# Patient Record
Sex: FEMALE | Race: WHITE | NOT HISPANIC OR LATINO | Employment: FULL TIME | URBAN - METROPOLITAN AREA
[De-identification: names, ages, dates, MRNs, and addresses within clinical notes are randomized per-mention and may not be internally consistent; named-entity substitution may affect disease eponyms.]

---

## 2017-12-15 PROBLEM — M54.50 ACUTE BILATERAL LOW BACK PAIN WITHOUT SCIATICA: Status: ACTIVE | Noted: 2017-12-15

## 2018-05-03 PROBLEM — M79.2 PERIPHERAL NEUROPATHIC PAIN: Status: ACTIVE | Noted: 2018-05-03

## 2018-06-12 PROBLEM — G89.29 CHRONIC HEEL PAIN, LEFT: Status: ACTIVE | Noted: 2018-06-12

## 2018-06-12 PROBLEM — M79.672 CHRONIC HEEL PAIN, LEFT: Status: ACTIVE | Noted: 2018-06-12

## 2018-07-18 PROBLEM — M72.2 PLANTAR FASCIITIS, LEFT: Status: ACTIVE | Noted: 2018-07-18

## 2018-07-18 PROBLEM — M77.32 CALCANEAL SPUR OF LEFT FOOT: Status: ACTIVE | Noted: 2018-07-18

## 2018-09-25 PROBLEM — F41.9 ANXIETY: Status: ACTIVE | Noted: 2018-09-25

## 2019-02-11 ENCOUNTER — OFFICE VISIT (OUTPATIENT)
Dept: BEHAVIORAL HEALTH | Facility: CLINIC | Age: 49
End: 2019-02-11
Payer: OTHER GOVERNMENT

## 2019-02-11 DIAGNOSIS — F43.23 ADJUSTMENT DISORDER WITH MIXED ANXIETY AND DEPRESSED MOOD: ICD-10-CM

## 2019-02-11 PROCEDURE — 90791 PSYCH DIAGNOSTIC EVALUATION: CPT | Performed by: PSYCHOLOGIST

## 2019-03-04 ENCOUNTER — OFFICE VISIT (OUTPATIENT)
Dept: BEHAVIORAL HEALTH | Facility: CLINIC | Age: 49
End: 2019-03-04
Payer: OTHER GOVERNMENT

## 2019-03-04 DIAGNOSIS — F43.23 ADJUSTMENT DISORDER WITH MIXED ANXIETY AND DEPRESSED MOOD: ICD-10-CM

## 2019-03-04 PROCEDURE — 90834 PSYTX W PT 45 MINUTES: CPT | Performed by: PSYCHOLOGIST

## 2019-03-04 NOTE — BH THERAPY
[facility]  Therapy Progress Note    Patient Name: Yane Lane  Patient MRN: [id number]  Today's Date: 3/4/2019     Type of session:Individual psychotherapy  Length of session: 45 minutes  Persons in attendance:Patient    Subjective/New Info: client reported 3 panic attacks while at work.  She is not practicing the breathing techniques.  She is having difficulty with her 19 year old who got ripped off on the internet after client told her she would get ripped off.  Panic attacks came after daughters loss of 600 on the internet.  Client has a long hx of telling 19 year old daughter what to do, what to expect, not trusting daughter to do what is best.  Daughter is rebelling (much reactance)  Explained same to client and agree that what client is doing is no longer, if ever, working.  Offered client alternative ways to interact with client unsure if she can bx different.  Talked about over the counter options for stress/anxiety until client meets with prescriber and suggested client talk to pharmacist about options.      Objective/Observations:   Participation: Active verbal participation   Grooming: Casual   Cognition: Alert   Eye contact: Good   Mood: Angry   Affect: Anxious and Angry   Thought process: Logical   Speech: hyper/load   Other:       Diagnoses:   1. Adjustment disorder with mixed anxiety and depressed mood         Current risk:   SUICIDE: Low   Homicide: Not applicable   Self-harm: Not applicable   Relapse: Not applicable   Other:    Safety Plan reviewed? No   If evidence of imminent risk is present, intervention/plan:          Therapeutic Intervention(s): Communication skills, Interpersonal effectiveness skills, Maladaptive behavior addressed, Parenting skills, Positive behavior reinforced, Psychoeducation RE: anxiety and Supportive psychotherapy    Treatment Goal(s)/Objective(s) addressed: improved coping skills     Progress toward Treatment Goals: No change    Plan: r/o BPD, client is  impulsive and attachment/abandonment affected - Next appointment scheduled:  3/11/2019    Leonila Rothman, Ph.D.  3/4/2019

## 2019-03-11 ENCOUNTER — OFFICE VISIT (OUTPATIENT)
Dept: BEHAVIORAL HEALTH | Facility: CLINIC | Age: 49
End: 2019-03-11
Payer: OTHER GOVERNMENT

## 2019-03-11 DIAGNOSIS — F43.23 ADJUSTMENT DISORDER WITH MIXED ANXIETY AND DEPRESSED MOOD: ICD-10-CM

## 2019-03-11 PROCEDURE — 90834 PSYTX W PT 45 MINUTES: CPT | Performed by: PSYCHOLOGIST

## 2019-03-11 NOTE — BH THERAPY
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  Session focused on building clients narrative about early hx with family and consequences to client's personality and parenting which is generally positive.  Client reporting increased ability to be a sounding block for daughter with client indicating feeling good about same.     Objective/Observations:   Participation: Active verbal participation   Grooming: Good   Cognition: Alert   Eye contact: Good   Mood: Euthymic   Affect: Happy   Thought process: Logical   Speech: Rate within normal limits   Other:     Diagnoses:   1. Adjustment disorder with mixed anxiety and depressed mood         Current risk:   SUICIDE: Not applicable   Homicide: Not applicable   Self-harm: Not applicable   Relapse: Not applicable   Other:    Safety Plan reviewed? No   If evidence of imminent risk is present, intervention/plan:          Therapeutic Intervention(s): Supportive psychotherapy    Treatment Goal(s)/Objective(s) addressed: improved mood     Progress toward Treatment Goals: Significant improvement    Plan: will meet in one week to determine mood stability and possible d/c  - Next appointment scheduled:  3/18/2019    Leonila Rothman, Ph.D.  3/11/2019

## 2019-03-18 ENCOUNTER — OFFICE VISIT (OUTPATIENT)
Dept: BEHAVIORAL HEALTH | Facility: CLINIC | Age: 49
End: 2019-03-18
Payer: OTHER GOVERNMENT

## 2019-03-18 DIAGNOSIS — F43.23 ADJUSTMENT DISORDER WITH MIXED ANXIETY AND DEPRESSED MOOD: ICD-10-CM

## 2019-03-18 PROCEDURE — 90834 PSYTX W PT 45 MINUTES: CPT | Performed by: PSYCHOLOGIST

## 2019-03-18 NOTE — BH THERAPY
[facility]  Therapy Progress Note    Patient Name: Yane Lane  Patient MRN: [id number]  Today's Date: 3/18/2019     Type of session:Individual psychotherapy  Length of session: 45 minutes  Persons in attendance:Patient    Subjective/New Info: client reported waking last Wednesday crying and cried till 11;30 then stopped.  She had no thoughts, events, concerns connected with the crying; she was not sad, lonely, scared.  Talked at length about client's symptoms of emotionality and talking to herself and it does not appear mental health related. Per client, her life is just about perfect.  Talked again about cushings with client indicating she will continue to work with md.  Suggested client send PCP a my chart and advise she will d/c from 1x1 psychotherapy at this time and move forward with  MD to determine causation of symptoms from a medical perspective.  Client to return if mood warrants.     Objective/Observations:   Participation: Active verbal participation   Grooming: Casual   Cognition: Alert and Fully Oriented   Eye contact: Good   Mood: Euthymic   Affect: Congruent with content, Bright and Happy   Thought process: Logical   Speech: Rate within normal limits   Other:     Diagnoses: adj d/o with mixed mood related to incongruent emotionality.     Current risk:   SUICIDE: Not applicable   Homicide: Not applicable   Self-harm: Not applicable   Relapse: Not applicable   Other:    Safety Plan reviewed? No   If evidence of imminent risk is present, intervention/plan:          Therapeutic Intervention(s): Supportive psychotherapy    Treatment Goal(s)/Objective(s) addressed: improved mood     Progress toward Treatment Goals: No change    Plan:  - Termination of 1`x1 trx    Leonila Rothman, Ph.D.  3/18/2019

## 2023-02-14 PROBLEM — M23.92 DERANGEMENT OF LEFT KNEE: Status: ACTIVE | Noted: 2023-02-02

## 2023-02-14 PROBLEM — G47.33 OBSTRUCTIVE SLEEP APNEA SYNDROME: Status: ACTIVE | Noted: 2023-02-14

## 2023-02-14 PROBLEM — M51.36 DEGENERATION OF LUMBAR INTERVERTEBRAL DISC: Status: ACTIVE | Noted: 2023-02-01

## 2023-02-14 PROBLEM — M54.16 LUMBAR RADICULOPATHY: Status: ACTIVE | Noted: 2023-01-30

## 2024-04-18 ENCOUNTER — PRE-ADMISSION TESTING (OUTPATIENT)
Dept: ADMISSIONS | Facility: MEDICAL CENTER | Age: 54
End: 2024-04-18
Attending: ORTHOPAEDIC SURGERY
Payer: OTHER GOVERNMENT

## 2024-04-18 DIAGNOSIS — Z01.812 PRE-OPERATIVE LABORATORY EXAMINATION: ICD-10-CM

## 2024-04-18 RX ORDER — ORAL SEMAGLUTIDE 7 MG/1
TABLET ORAL EVERY MORNING
COMMUNITY
Start: 2024-03-19

## 2024-04-18 RX ORDER — ROSUVASTATIN CALCIUM 10 MG/1
TABLET, COATED ORAL EVERY MORNING
COMMUNITY
Start: 2024-03-19

## 2024-04-18 RX ORDER — TRAMADOL HYDROCHLORIDE 50 MG/1
TABLET ORAL
COMMUNITY
Start: 2024-04-15

## 2024-04-18 NOTE — PREPROCEDURE INSTRUCTIONS
PreAdmit Telephone Appointment: Reviewed the Preparing for your procedure handout with patient over the phone. Patient instructed per pharmacy guidelines regarding taking, holding or contacting provider for instructions on regularly prescribed medications before surgery. Instructed to take the following medications the day of surgery with a sip of water per pharmacy medication guidelines: Tylenol if needed, Tramadol if needed, Cyclobenzaprine if needed, Levothyroxine, Rosuvastatin  Pt instructed to hold Rybelsus per anesthesia protocol, pt took this medication this morning so she will be off medication 5 days prior to surgery.  Tory at Dr. Santana informed of this to notify Dr. Santana and that pt is instructed and she plans today to call her endocrinologist for further instructions on her diabetic medications prior to surgery to control blood sugars  Pt reports her legs felt weak after having Cholecystectomy, advised to discuss with anesthesia day of surgery      Confirmed with patient where to check in morning of surgery. Handouts/Brochure/Video emailed to patient.

## 2024-04-24 ENCOUNTER — HOSPITAL ENCOUNTER (OUTPATIENT)
Facility: MEDICAL CENTER | Age: 54
End: 2024-04-24
Attending: ORTHOPAEDIC SURGERY | Admitting: ORTHOPAEDIC SURGERY
Payer: OTHER GOVERNMENT

## 2024-04-24 ENCOUNTER — ANESTHESIA EVENT (OUTPATIENT)
Dept: SURGERY | Facility: MEDICAL CENTER | Age: 54
End: 2024-04-24
Payer: OTHER GOVERNMENT

## 2024-04-24 ENCOUNTER — ANESTHESIA (OUTPATIENT)
Dept: SURGERY | Facility: MEDICAL CENTER | Age: 54
End: 2024-04-24
Payer: OTHER GOVERNMENT

## 2024-04-24 VITALS
HEART RATE: 66 BPM | SYSTOLIC BLOOD PRESSURE: 129 MMHG | DIASTOLIC BLOOD PRESSURE: 73 MMHG | BODY MASS INDEX: 38.87 KG/M2 | TEMPERATURE: 97.3 F | OXYGEN SATURATION: 91 % | RESPIRATION RATE: 16 BRPM | WEIGHT: 219.36 LBS | HEIGHT: 63 IN

## 2024-04-24 DIAGNOSIS — G56.21 CUBITAL TUNNEL SYNDROME ON RIGHT: ICD-10-CM

## 2024-04-24 LAB — GLUCOSE BLD STRIP.AUTO-MCNC: 214 MG/DL (ref 65–99)

## 2024-04-24 PROCEDURE — 160041 HCHG SURGERY MINUTES - EA ADDL 1 MIN LEVEL 4: Performed by: ORTHOPAEDIC SURGERY

## 2024-04-24 PROCEDURE — 160025 RECOVERY II MINUTES (STATS): Performed by: ORTHOPAEDIC SURGERY

## 2024-04-24 PROCEDURE — 700101 HCHG RX REV CODE 250: Performed by: ORTHOPAEDIC SURGERY

## 2024-04-24 PROCEDURE — 700111 HCHG RX REV CODE 636 W/ 250 OVERRIDE (IP): Performed by: ANESTHESIOLOGY

## 2024-04-24 PROCEDURE — 82962 GLUCOSE BLOOD TEST: CPT

## 2024-04-24 PROCEDURE — 160029 HCHG SURGERY MINUTES - 1ST 30 MINS LEVEL 4: Performed by: ORTHOPAEDIC SURGERY

## 2024-04-24 PROCEDURE — A9270 NON-COVERED ITEM OR SERVICE: HCPCS | Performed by: ANESTHESIOLOGY

## 2024-04-24 PROCEDURE — 160035 HCHG PACU - 1ST 60 MINS PHASE I: Performed by: ORTHOPAEDIC SURGERY

## 2024-04-24 PROCEDURE — 700111 HCHG RX REV CODE 636 W/ 250 OVERRIDE (IP): Mod: JZ | Performed by: ANESTHESIOLOGY

## 2024-04-24 PROCEDURE — 700105 HCHG RX REV CODE 258: Performed by: ORTHOPAEDIC SURGERY

## 2024-04-24 PROCEDURE — 160048 HCHG OR STATISTICAL LEVEL 1-5: Performed by: ORTHOPAEDIC SURGERY

## 2024-04-24 PROCEDURE — 700111 HCHG RX REV CODE 636 W/ 250 OVERRIDE (IP): Performed by: ORTHOPAEDIC SURGERY

## 2024-04-24 PROCEDURE — 160002 HCHG RECOVERY MINUTES (STAT): Performed by: ORTHOPAEDIC SURGERY

## 2024-04-24 PROCEDURE — 700102 HCHG RX REV CODE 250 W/ 637 OVERRIDE(OP): Performed by: ANESTHESIOLOGY

## 2024-04-24 PROCEDURE — 160009 HCHG ANES TIME/MIN: Performed by: ORTHOPAEDIC SURGERY

## 2024-04-24 PROCEDURE — 160046 HCHG PACU - 1ST 60 MINS PHASE II: Performed by: ORTHOPAEDIC SURGERY

## 2024-04-24 PROCEDURE — 160036 HCHG PACU - EA ADDL 30 MINS PHASE I: Performed by: ORTHOPAEDIC SURGERY

## 2024-04-24 RX ORDER — CEFAZOLIN SODIUM 1 G/3ML
INJECTION, POWDER, FOR SOLUTION INTRAMUSCULAR; INTRAVENOUS PRN
Status: DISCONTINUED | OUTPATIENT
Start: 2024-04-24 | End: 2024-04-24 | Stop reason: SURG

## 2024-04-24 RX ORDER — OXYCODONE HCL 5 MG/5 ML
5 SOLUTION, ORAL ORAL
Status: COMPLETED | OUTPATIENT
Start: 2024-04-24 | End: 2024-04-24

## 2024-04-24 RX ORDER — KETOROLAC TROMETHAMINE 15 MG/ML
15 INJECTION, SOLUTION INTRAMUSCULAR; INTRAVENOUS ONCE
Status: COMPLETED | OUTPATIENT
Start: 2024-04-24 | End: 2024-04-24

## 2024-04-24 RX ORDER — SODIUM CHLORIDE, SODIUM LACTATE, POTASSIUM CHLORIDE, CALCIUM CHLORIDE 600; 310; 30; 20 MG/100ML; MG/100ML; MG/100ML; MG/100ML
INJECTION, SOLUTION INTRAVENOUS CONTINUOUS
Status: DISCONTINUED | OUTPATIENT
Start: 2024-04-24 | End: 2024-04-24 | Stop reason: HOSPADM

## 2024-04-24 RX ORDER — MIDAZOLAM HYDROCHLORIDE 1 MG/ML
INJECTION INTRAMUSCULAR; INTRAVENOUS PRN
Status: DISCONTINUED | OUTPATIENT
Start: 2024-04-24 | End: 2024-04-24 | Stop reason: SURG

## 2024-04-24 RX ORDER — KETOROLAC TROMETHAMINE 15 MG/ML
INJECTION, SOLUTION INTRAMUSCULAR; INTRAVENOUS PRN
Status: DISCONTINUED | OUTPATIENT
Start: 2024-04-24 | End: 2024-04-24 | Stop reason: SURG

## 2024-04-24 RX ORDER — OXYCODONE HCL 5 MG/5 ML
10 SOLUTION, ORAL ORAL
Status: COMPLETED | OUTPATIENT
Start: 2024-04-24 | End: 2024-04-24

## 2024-04-24 RX ORDER — HALOPERIDOL 5 MG/ML
1 INJECTION INTRAMUSCULAR
Status: DISCONTINUED | OUTPATIENT
Start: 2024-04-24 | End: 2024-04-24 | Stop reason: HOSPADM

## 2024-04-24 RX ORDER — LIDOCAINE HYDROCHLORIDE AND EPINEPHRINE 10; 10 MG/ML; UG/ML
INJECTION, SOLUTION INFILTRATION; PERINEURAL
Status: DISCONTINUED | OUTPATIENT
Start: 2024-04-24 | End: 2024-04-24 | Stop reason: HOSPADM

## 2024-04-24 RX ORDER — SODIUM CHLORIDE, SODIUM LACTATE, POTASSIUM CHLORIDE, CALCIUM CHLORIDE 600; 310; 30; 20 MG/100ML; MG/100ML; MG/100ML; MG/100ML
INJECTION, SOLUTION INTRAVENOUS CONTINUOUS
Status: ACTIVE | OUTPATIENT
Start: 2024-04-24 | End: 2024-04-24

## 2024-04-24 RX ORDER — BUPIVACAINE HYDROCHLORIDE AND EPINEPHRINE 5; 5 MG/ML; UG/ML
INJECTION, SOLUTION EPIDURAL; INTRACAUDAL; PERINEURAL
Status: DISCONTINUED | OUTPATIENT
Start: 2024-04-24 | End: 2024-04-24 | Stop reason: HOSPADM

## 2024-04-24 RX ORDER — KETOROLAC TROMETHAMINE 30 MG/ML
15 INJECTION, SOLUTION INTRAMUSCULAR; INTRAVENOUS ONCE
Status: DISCONTINUED | OUTPATIENT
Start: 2024-04-24 | End: 2024-04-24

## 2024-04-24 RX ORDER — DIPHENHYDRAMINE HYDROCHLORIDE 50 MG/ML
12.5 INJECTION INTRAMUSCULAR; INTRAVENOUS
Status: DISCONTINUED | OUTPATIENT
Start: 2024-04-24 | End: 2024-04-24 | Stop reason: HOSPADM

## 2024-04-24 RX ORDER — DEXAMETHASONE SODIUM PHOSPHATE 4 MG/ML
INJECTION, SOLUTION INTRA-ARTICULAR; INTRALESIONAL; INTRAMUSCULAR; INTRAVENOUS; SOFT TISSUE PRN
Status: DISCONTINUED | OUTPATIENT
Start: 2024-04-24 | End: 2024-04-24 | Stop reason: SURG

## 2024-04-24 RX ORDER — ONDANSETRON 2 MG/ML
INJECTION INTRAMUSCULAR; INTRAVENOUS PRN
Status: DISCONTINUED | OUTPATIENT
Start: 2024-04-24 | End: 2024-04-24 | Stop reason: SURG

## 2024-04-24 RX ORDER — ONDANSETRON 2 MG/ML
4 INJECTION INTRAMUSCULAR; INTRAVENOUS
Status: DISCONTINUED | OUTPATIENT
Start: 2024-04-24 | End: 2024-04-24 | Stop reason: HOSPADM

## 2024-04-24 RX ADMIN — PROPOFOL 150 MG: 10 INJECTION, EMULSION INTRAVENOUS at 11:20

## 2024-04-24 RX ADMIN — DEXAMETHASONE SODIUM PHOSPHATE 4 MG: 4 INJECTION INTRA-ARTICULAR; INTRALESIONAL; INTRAMUSCULAR; INTRAVENOUS; SOFT TISSUE at 11:31

## 2024-04-24 RX ADMIN — ONDANSETRON 4 MG: 2 INJECTION INTRAMUSCULAR; INTRAVENOUS at 11:31

## 2024-04-24 RX ADMIN — FENTANYL CITRATE 25 MCG: 50 INJECTION, SOLUTION INTRAMUSCULAR; INTRAVENOUS at 12:41

## 2024-04-24 RX ADMIN — MIDAZOLAM HYDROCHLORIDE 2 MG: 1 INJECTION, SOLUTION INTRAMUSCULAR; INTRAVENOUS at 11:20

## 2024-04-24 RX ADMIN — FENTANYL CITRATE 100 MCG: 50 INJECTION, SOLUTION INTRAMUSCULAR; INTRAVENOUS at 11:20

## 2024-04-24 RX ADMIN — CEFAZOLIN 2 G: 1 INJECTION, POWDER, FOR SOLUTION INTRAMUSCULAR; INTRAVENOUS at 11:28

## 2024-04-24 RX ADMIN — KETOROLAC TROMETHAMINE 15 MG: 15 INJECTION, SOLUTION INTRAMUSCULAR; INTRAVENOUS at 15:13

## 2024-04-24 RX ADMIN — KETOROLAC TROMETHAMINE 15 MG: 15 INJECTION, SOLUTION INTRAMUSCULAR; INTRAVENOUS at 11:31

## 2024-04-24 RX ADMIN — OXYCODONE HYDROCHLORIDE 5 MG: 5 SOLUTION ORAL at 12:41

## 2024-04-24 RX ADMIN — SODIUM CHLORIDE, POTASSIUM CHLORIDE, SODIUM LACTATE AND CALCIUM CHLORIDE: 600; 310; 30; 20 INJECTION, SOLUTION INTRAVENOUS at 10:11

## 2024-04-24 ASSESSMENT — PAIN DESCRIPTION - PAIN TYPE
TYPE: SURGICAL PAIN
TYPE: OTHER (COMMENT)
TYPE: ACUTE PAIN
TYPE: SURGICAL PAIN

## 2024-04-24 ASSESSMENT — FIBROSIS 4 INDEX: FIB4 SCORE: 1.24

## 2024-04-24 ASSESSMENT — PAIN SCALES - GENERAL: PAIN_LEVEL: 1

## 2024-04-24 NOTE — OR NURSING
1206 Pt to PACU from OR via gurney, respirations spontaneous and non-labored via OPA. Right arm surgical sites clean, dry and intact, pt not arousable on calling. Ice pack placed on right arm surgical sight, 2+ radial pulse noted and cap refill < 2 seconds to right fingers. Dr. Moreno said no repeat FSBS needed in PACU pt is to go home and take insulin once she eats. Pt has small red bruise to skin on left upper arm, OR RN reports injury sustained from towel clip.       1215 Pt spontaneously arouses OPA D/C'd   1220 Pt calm sleeping.   1235 Pt reports a headache and 3/10 pain to right arm. Denies nausea. Plan to medicate see MAR.  1250 No changes pt calm sleeping.   1252 Report to SHAUN Kitchen  1330 Received report from Fidelina DUNN, assumed care of pt. Pt arousable on calling, denies nausea and reports tolerable pain with non-labored and spontaneous respirations via 2L NC, VSS. Plan to titrate O2 and work IS.   1345 Pt repots no pain in right arm surgical incisions. Just c/o right sided headache, pt states she does get migraines occasionally. Neuro assessment completed pt facial symmetry WNL, arm strength equal bilaterally, speech is clear. Educated on POC and on incentive spirometry, patient able to pull between 1599-1945 mL w/ goal of 2200mL. Will continue to work on IS to get pt to D/C.   1400 pt reports headache persists pt scan BS implant reads 213 pt reports this is satisfactory. Will give caffeine to pt and eye ice pack for headache. IS has improved able to pull 2000mL   1415 Pt calm sleeping, de-sats to 85% despite use of IS, placed pt back on 1L NC.  1430 No changes. Pt calm resting with eyes closed.   1445 Pt reports headache has improved now. Still denies any pain to arm. Attempted to trial pt on room air again still de-sats to 79% will update MD.     Yesenia Santana updated on pt status he would like pt to go to stage II and walk around also orders received for Toradol for headache. Will implement.   1515  no changes  1530 no changes   1545 Pt repositioned and titrated to room air. Pt reports Toradol has improved the headache, feels more awake now and working IS.   1555 Pt able to maintain O2 saturation greater than 92% for 15 minutes. Pt meets criteria for transfer to stage II.     1559 Pt transported to stage II.

## 2024-04-24 NOTE — ANESTHESIA TIME REPORT
Anesthesia Start and Stop Event Times       Date Time Event    4/24/2024 0956 Ready for Procedure     1115 Anesthesia Start     1208 Anesthesia Stop          Responsible Staff  04/24/24      Name Role Begin End    Fred Moreno M.D. Anesth 1115 1208          Overtime Reason:  no overtime (within assigned shift)    Comments:

## 2024-04-24 NOTE — ANESTHESIA PREPROCEDURE EVALUATION
Case: [id number] Date/Time: 04/24/24 1045    Procedures:       RIGHT CUBITAL TUNNEL RELEASE WITH ENDOSCOPIC CARPAL TUNNEL RELEASE      RELEASE, CARPAL TUNNEL, ENDOSCOPIC    Pre-op diagnosis: CARPAL TUNNEL SYNDROME OF RIGHT WRIST    Location: [facility] OR 06 / SURGERY [facility]    Surgeons: Maldonado Santana M.D.            Relevant Problems   ANESTHESIA   (positive) Obstructive sleep apnea syndrome      ENDO   (positive) Hypothyroidism due to acquired atrophy of thyroid   (positive) Insulin dependent type 2 diabetes mellitus (HCC)       Physical Exam    Airway   Mallampati: II  TM distance: >3 FB  Neck ROM: full       Cardiovascular - normal exam  Rhythm: regular  Rate: normal  (-) murmur     Dental - normal exam           Pulmonary - normal exam  Breath sounds clear to auscultation     Abdominal   (+) obese     Neurological - normal exam                   Anesthesia Plan    ASA 2       Plan - general       Airway plan will be ETT          Induction: intravenous    Postoperative Plan: Postoperative administration of opioids is intended.    Pertinent diagnostic labs and testing reviewed    Informed Consent:    Anesthetic plan and risks discussed with patient.    Use of blood products discussed with: patient whom consented to blood products.

## 2024-04-24 NOTE — OR NURSING
0844 Procedure, patient allergies and NPO status verified. Home med rec completed and belongings secured. Patient verbalizes understanding of pain scale, expected course of stay and plan of care. IV access established. SCD placed on bilateral calves.     1000 Preop complete.     1005 FSBS 214 mg/dl. CBC results from [facility] not found, Dr Moreno states patient does not require a CBC.

## 2024-04-24 NOTE — ANESTHESIA POSTPROCEDURE EVALUATION
Patient: Yane Lane    Procedure Summary       Date: 04/24/24 Room / Location:  OR 06 / SURGERY [facility]    Anesthesia Start: 1115 Anesthesia Stop: 1208    Procedures:       RIGHT CUBITAL TUNNEL RELEASE WITH ENDOSCOPIC CARPAL TUNNEL RELEASE (Right: Hand)      RELEASE, CARPAL TUNNEL, ENDOSCOPIC (Right: Hand) Diagnosis: (CARPAL TUNNEL SYNDROME OF RIGHT WRIST)    Surgeons: Maldonado Santana M.D. Responsible Provider: Fred Moreno M.D.    Anesthesia Type: general ASA Status: 2            Final Anesthesia Type: general  Last vitals  BP   Blood Pressure: 132/81    Temp   36.3 °C (97.3 °F)    Pulse   82   Resp   16    SpO2   93 %      Anesthesia Post Evaluation    Patient location during evaluation: PACU  Patient participation: complete - patient participated  Level of consciousness: awake and alert  Pain score: 1    Airway patency: patent  Anesthetic complications: no  Cardiovascular status: hemodynamically stable  Respiratory status: acceptable  Hydration status: euvolemic    PONV: none          There were no known notable events for this encounter.     Nurse Pain Score: 3 (NPRS)

## 2024-04-24 NOTE — OP REPORT
DATE OF SURGERY:  4/24/2024     PREOPERATIVE DIAGNOSIS:  Right cubital tunnel syndrome.  Right carpal tunnel syndrome     POSTOPERATIVE DIAGNOSIS:  Right cubital tunnel syndrome.  Right carpal tunnel syndrome     SURGERY PERFORMED:  Right cubital tunnel release.  Right endoscopic carpal tunnel release     SURGEON:  Maldonado Santana MD     ANESTHESIA: General.     ASSISTANT:  None     COMPLICATIONS:  None.     TOURNIQUET TIME:  14 min.     TOURNIQUET PRESSURE:  250 mmHg.    INDICATIONS FOR PROCEDURE:  This is a pleasant patient who has had    persistent right carpal and cubital tunnel syndromes.  She has tried nonoperative    modalities including activity modifications, NSAIDs, and these have    not resolved the symptoms.  After electrodiagnostic studies indicated carpal   and cubital tunnel syndrome with evidence of compression at the wrist and elbow, the decision  was made to take them to the operative room for the above-mentioned procedure.     DESCRIPTION OF PROCEDURE:  On the day of surgery the patient was seen in the   preoperative area where informed consent was obtained with all risks and    benefits of the procedure explained and all questions answered.  He wished to   proceed with the surgery.  The proper site was marked. The patient was subsequently    taken to the operative room and placed in the supine position with all bony    prominences well padded.  A tourniquet was placed on the left upper    extremity.  It was then prepped and draped in the usual sterile fashion.     A timeout was performed with all persons and attendants agreeing on the proper   patient, proper surgical site, and proper surgery to be performed.     An esmarch was used to exsanguinate the right extremity and the tourniquet was insuflated to 250 mmHg.  A transverse incision was made at the distal wrist crease from the ulnar border of the  palmaris longus ulnarly.  This was roughly 1 cm in length.  Sharp and blunt    dissection  was taken down to the level of the forearm fascia.  Skin hooks were   then placed proximally and distally to help retract soft tissues.  The    forearm fascia was visualized and this was incised with a [place] blade.  The    forearm fascia was then retracted distally with a double skin hook.  The    Spatula provided in the [employer] Centerline Endoscope tray was then placed deep to the   transverse carpal ligament and the canal was prepped for the endoscope by placing    increasingly larger trocars x2.  Next, the endoscope was placed deep to the    transverse carpal ligament, which was visualized clearly.  The distal aspect    of the transverse carpal ligament was then incised and this was visualized to    ensure complete transection distally.  Once this was ensured, the blade was    then maintained elevated and the endoscope was brought proximally across the  transverse carpal ligament.  The entirety of the transverse carpal ligament was incised and the endoscope was removed.     The wound was irrigated with normal saline and closed with 4-0 nylon in a    horizontal mattress fashion.  The wound was then dressed with Xeroform, 4x4 gauze and a tegaderm dressing.     A standard incision over the ulnar nerve between the medial epicondyle and the olecranon was made, roughly 6 cm in length.  Sharp and blunt dissection was taken down to the level of the cubital tunnel.  Bleeding was well controlled with bovie electrocautery.  The Beckford's ligament was visualized and sharply incised in line with the nerve using a [place] blade.  Tenotomy scissors were then used to compete the release.  This was carried proximally to the [place] of [place]'s and distally to the flexor carpi ulnaris.  The FCU fascia was then incised and care was taken to protect the branches of the ulnar nerve to the FCU.  The intermuscular septum was then incised just proximal to the medial epicondyle.  The elbow was then maximally flexed and the nerve did not  subluxate, therefore a transposition was not performed.  The wound was irrigated with normal saline and wounds were closed with monocryl in the subcutaneous tissues and staples for the skin.  The wound was dressed with xeroform, 4x4's, tegaderm and a loosely approximated ACE wrap.    DISPOSITION:  Plan for discharge to home on a regular diet.  Weightbearing  as tolerated to the operative extremity with the exception of a 10-pound    lifting restriction.  Bandages may be removed after 72 hours.  At that time may   begin showering and bathing as normal, but should not soak the wound.    Return to clinic in 10-14 days.

## 2024-04-24 NOTE — OR NURSING
1559: Patient arrived to phase II from PACU 1 via gurney. Report received from RN. Respirations are spontaneous and unlabored. VSS on RA. Dressing is dry with serosanguinous drainage. RUE: cap refill less than 3 seconds, warm.    1603: Family at bedside.     1612: Patient education completed, family denies further questions. IV removed with tip intact. DC'd to care of family post uneventful stay in PACU 2.

## 2024-04-24 NOTE — OR NURSING
1300-Pt care assumed. Received report from lForida DUNN. Pt sleeping at this time. Not roused. Dressings are c/d/I/ on the right forearm.     1315- Roused the patient. Pt denies pain in the right arm, but c/o a headache with light sensitivity. Pt is aaox4. Placed an ice pack on the patients neck to help with headache.     1330- Pt sleeping. Not roused at this time. Report to [place] DUNN.

## (undated) DEVICE — ANTI-FOG SOLUTION - 60BTL/CA

## (undated) DEVICE — ELECTRODE DUAL RETURN W/ CORD - (50/PK)

## (undated) DEVICE — LACTATED RINGERS INJ 1000 ML - (14EA/CA 60CA/PF)

## (undated) DEVICE — SODIUM CHL IRRIGATION 0.9% 1000ML (12EA/CA)

## (undated) DEVICE — PACK UPPER EXTREMITY SM OR - (3/CA)

## (undated) DEVICE — DRESSING TRANSPARENT FILM TEGADERM 2.375 X 2.75"  (100EA/BX)"

## (undated) DEVICE — SUTURE 4-0 ETHILON PS-2 18 (12PK/BX)"

## (undated) DEVICE — BLADE BEAVER 6400 MINI EYE ROUND TIP SHARP ON ONE SIDE (20/CA)

## (undated) DEVICE — INSTRUMENT ENDOSCOPIC CENTERLINE RELEASE

## (undated) DEVICE — SUTURE GENERAL

## (undated) DEVICE — ADHESIVE MASTISOL - (48/BX)

## (undated) DEVICE — SUTURE 3-0 MONOCRYL PLUS PS-1 - 27 INCH (36/BX)

## (undated) DEVICE — PADDING CAST 4 IN STERILE - 4 X 4 YDS (24/CA)

## (undated) DEVICE — STAPLER SKIN DISP - (6/BX 10BX/CA) VISISTAT

## (undated) DEVICE — PAD PREP 24 X 48 CUFFED - (100/CA)

## (undated) DEVICE — GLOVE BIOGEL SZ 8 SURGICAL PF LTX - (50PR/BX 4BX/CA)

## (undated) DEVICE — DRESSING TRANSPARENT FILM TEGADERM 4 X 4.75" (50EA/BX)"

## (undated) DEVICE — CHLORAPREP 26 ML APPLICATOR - ORANGE TINT(25/CA)

## (undated) DEVICE — GLOVE BIOGEL INDICATOR SZ 8 SURGICAL PF LTX - (50/BX 4BX/CA)

## (undated) DEVICE — TOWEL STOP TIMEOUT SAFETY FLAG (40EA/CA)